# Patient Record
Sex: FEMALE | Race: WHITE | NOT HISPANIC OR LATINO | Employment: PART TIME
[De-identification: names, ages, dates, MRNs, and addresses within clinical notes are randomized per-mention and may not be internally consistent; named-entity substitution may affect disease eponyms.]

---

## 2017-06-24 ENCOUNTER — HEALTH MAINTENANCE LETTER (OUTPATIENT)
Age: 42
End: 2017-06-24

## 2024-02-11 ENCOUNTER — HOSPITAL ENCOUNTER (EMERGENCY)
Facility: HOSPITAL | Age: 49
Discharge: HOME | End: 2024-02-11
Admitting: PHYSICIAN ASSISTANT
Payer: COMMERCIAL

## 2024-02-11 VITALS
HEART RATE: 98 BPM | WEIGHT: 116.7 LBS | BODY MASS INDEX: 21.47 KG/M2 | HEIGHT: 62 IN | RESPIRATION RATE: 18 BRPM | TEMPERATURE: 97.8 F | DIASTOLIC BLOOD PRESSURE: 87 MMHG | SYSTOLIC BLOOD PRESSURE: 132 MMHG | OXYGEN SATURATION: 99 %

## 2024-02-11 DIAGNOSIS — N39.0 UTI (URINARY TRACT INFECTION): ICD-10-CM

## 2024-02-11 LAB
ALBUMIN UR-MCNC: 20 MG/DL
APPEARANCE UR: ABNORMAL
BACTERIA #/AREA URNS HPF: ABNORMAL /HPF
BILIRUB UR QL STRIP: NEGATIVE
COLOR UR AUTO: YELLOW
GLUCOSE UR STRIP-MCNC: NEGATIVE MG/DL
HGB UR QL STRIP: NEGATIVE
HYALINE CASTS: 2 /LPF
KETONES UR STRIP-MCNC: NEGATIVE MG/DL
LEUKOCYTE ESTERASE UR QL STRIP: ABNORMAL
NITRATE UR QL: POSITIVE
PH UR STRIP: 6.5 [PH] (ref 5–7)
RBC URINE: 6 /HPF
SP GR UR STRIP: 1.02 (ref 1–1.03)
SQUAMOUS EPITHELIAL: 3 /HPF
UROBILINOGEN UR STRIP-MCNC: <2 MG/DL
WBC URINE: >182 /HPF

## 2024-02-11 PROCEDURE — 99283 EMERGENCY DEPT VISIT LOW MDM: CPT

## 2024-02-11 PROCEDURE — 87186 SC STD MICRODIL/AGAR DIL: CPT | Performed by: PHYSICIAN ASSISTANT

## 2024-02-11 PROCEDURE — 81001 URINALYSIS AUTO W/SCOPE: CPT | Performed by: PHYSICIAN ASSISTANT

## 2024-02-11 PROCEDURE — 87086 URINE CULTURE/COLONY COUNT: CPT | Performed by: PHYSICIAN ASSISTANT

## 2024-02-11 RX ORDER — NITROFURANTOIN 25; 75 MG/1; MG/1
100 CAPSULE ORAL 2 TIMES DAILY
Qty: 10 CAPSULE | Refills: 0 | Status: SHIPPED | OUTPATIENT
Start: 2024-02-11 | End: 2024-02-16

## 2024-02-11 ASSESSMENT — ACTIVITIES OF DAILY LIVING (ADL): ADLS_ACUITY_SCORE: 33

## 2024-02-12 LAB — BACTERIA UR CULT: ABNORMAL

## 2024-02-12 NOTE — ED PROVIDER NOTES
EMERGENCY DEPARTMENT ENCOUNTER   NAME: Eva Hollingsworth ; AGE: 48 year old female ; YOB: 1975 ; MRN: [id number] ; PCP: Rachell Bermudez     Evaluation Date & Time: 2/11/2024  8:25 PM    ED Provider: María Hinds PA-C    CHIEF COMPLAINT     Urinary Frequency      FINAL ASSESSMENT       ICD-10-CM    1. UTI (urinary tract infection)  N39.0           ED COURSE, MEDICAL DECISION MAKING, PLAN     ED course   8:34 PM Introduced myself to the patient, obtained history of present illness, and performed initial physical exam at this time.   9:23 PM Discussed discharge with the patient, she is agreeable with this plan.      _____________________________________________________________________    Eva is a 48-year-old female presenting with 4 days of urinary frequency, dysuria, and malodorous urine.  States she has had 1 bladder infection in the past many years ago.  She denies any fevers, chills, nausea, vomiting.  She denies any concerns for vaginal infection or STDs.    On exam patient is nontoxic-appearing.  She is in no acute distress.  She has no suprapubic tenderness on palpation.  No abdominal pain.  No CVA tenderness.  Heart and lung sound clear.  She is vitally normal.    Considered UTI, pyelonephritis, interstitial cystitis, vaginitis, STD.    UA was completed here with signs of infection.  Culture pending.    Signs and symptoms appear to be consistent with an uncomplicated urinary tract infection.  Very low concern for an acutely serious or life-threatening condition.  She has no CVA tenderness, fevers, vomiting, tachycardia to suggest pyelonephritis.  She denies any vaginal symptoms or concern for STDs.  Low likelihood of interstitial cystitis given the evidence of infection and on recurrent symptoms.    Will discharge patient with a course of Macrobid.  Recommended pushing fluids.    Indications for reevaluation back in the ER discussed with patient.      *All pertinent lab & imaging studies  independently reviewed. (See chart for details)   *Discussed the results of all the tests and plan with patient and family/guardians.   *All questions were answered.   *The patient and/or family/guardian acknowledged understanding and was agreeable with the care plan.      HISTORY OF PRESENT ILLNESS   Patient information was obtained from: the patient   Use of Intrepreter: N/A     Eva Hollingsworth is a 48 year old female with a pertinent history of pyelonephritis, who presents to the ED for evaluation of urinary frequency.     The patient endorses urinary frequency, dysuria, and malodorous urine.    On 2/7 (four days ago), the patient began experiencing urinary frequency and dysuria. She has been trying AZO and cranberry juice at home. This helped improve symptoms for a short duration, but her symptoms have returned. Last UTI was in 2014. Yesterday, she noticed that her urine was malodorous. No concern for STD today.     The patient denies fever, chills, back pain, abdominal pain, vaginal discharge, vaginal bleeding, nausea, vomiting, and any other symptoms at this time.     MEDICAL HISTORY     Past Medical History:   Diagnosis Date    Bartholin cyst     Bipolar disorder (H)     Cervical dysplasia 1998    Genital herpes     GERD (gastroesophageal reflux disease)     Migraine        Past Surgical History:   Procedure Laterality Date    C/SECTION, LOW TRANSVERSE      CRYOTHERAPY, CERVICAL  1998    TUBAL LIGATION         Family History   Problem Relation Age of Onset    Alcohol/Drug Mother     Heart Disease Father         age 62    Alcohol/Drug Maternal Grandmother     Alcohol/Drug Maternal Grandfather     Cerebrovascular Disease Maternal Grandfather     Heart Disease Paternal Grandfather     Breast Cancer Maternal Aunt         age 56       Social History     Tobacco Use    Smoking status: Every Day     Packs/day: .5     Types: Cigarettes    Tobacco comments:     hoping to get back on chantix   Substance Use Topics     "Alcohol use: No    Drug use: No       nitroFURantoin macrocrystal-monohydrate (MACROBID) 100 MG capsule  cephALEXin (KEFLEX) 500 MG capsule  CLONAZEPAM PO  NORTRIPTYLINE HCL PO  QUEtiapine (SEROQUEL) 25 MG tablet  rizatriptan (MAXALT-MLT) 10 MG disintegrating tablet  topiramate (TOPAMAX) 25 MG capsule  TRAZODONE HCL PO          PHYSICAL EXAM     First Vitals:  Patient Vitals for the past 24 hrs:   BP Temp Temp src Pulse Resp SpO2 Height Weight   02/11/24 2023 132/87 97.8  F (36.6  C) Oral 98 18 99 % 1.575 m (5' 2\") 52.9 kg (116 lb 11.2 oz)         PHYSICAL EXAM:   Constitutional: No acute distress.  Neuro: Awake and alert.  Psych: Calm and cooperative.  Eyes: PERRL. EOMI. Conjunctivae clear.   Mouth: Pink and moist.   Cardio: Regular rate. Adequate perfusion to extremities. Regular rhythm. No murmurs.  Pulmonary: Oxygenating well on RA. No labored breathing. CTA b/l.  Abdomen: Soft and non-distended. No palpable pain.  Back: Appears to move freely. No CVA tenderness.    Skin: Natural color. Warm, dry, intact.       RESULTS     LAB:  All pertinent labs reviewed and interpreted  Labs Ordered and Resulted from Time of ED Arrival to Time of ED Departure   ROUTINE UA WITH MICROSCOPIC REFLEX TO CULTURE - Abnormal       Result Value    Color Urine Yellow      Appearance Urine Turbid (*)     Glucose Urine Negative      Bilirubin Urine Negative      Ketones Urine Negative      Specific Gravity Urine 1.024      Blood Urine Negative      pH Urine 6.5      Protein Albumin Urine 20 (*)     Urobilinogen Urine <2.0      Nitrite Urine Positive (*)     Leukocyte Esterase Urine 500 Joaquin/uL (*)     Bacteria Urine Many (*)     RBC Urine 6 (*)     WBC Urine >182 (*)     Squamous Epithelials Urine 3 (*)     Hyaline Casts Urine 2     URINE CULTURE       RADIOLOGY:  No orders to display       ECG:    N/A        PROCEDURES     None             History:  Supplemental history from: Documented in chart  External Record(s) reviewed: Documented " in chart    Work Up:  Chart documentation includes differentials considered and any EKGs or imaging independently interpreted by provider, where specified.  In additional to work up documented, I considered the following work up: Documented in chart, if applicable.    External consultation:  Discussion of management with another provider: Documented in chart, if applicable    Complicating factors:  Care impacted by chronic illness: N/A and Smoking / Nicotine Use  Care affected by social determinants of health: N/A    Disposition considerations: Discharge. I prescribed additional prescription strength medication(s) as charted. See documentation for any additional details.    FINAL IMPRESSION:    ICD-10-CM    1. UTI (urinary tract infection)  N39.0             MEDICATIONS GIVEN IN THE EMERGENCY DEPARTMENT:  Medications - No data to display      NEW PRESCRIPTIONS STARTED AT TODAY'S ED VISIT:  Discharge Medication List as of 2/11/2024  9:48 PM        START taking these medications    Details   nitroFURantoin macrocrystal-monohydrate (MACROBID) 100 MG capsule Take 1 capsule (100 mg) by mouth 2 times daily for 5 days, Disp-10 capsule, R-0, Local Print                  I, Steph Yeager, am serving as a scribe to document services personally performed by María Hinds PA-C, based on my observation and the provider's statements to me. I, María Hinds PA-C attest that Steph Yeager is acting in a scribe capacity, has observed my performance of the services and has documented them in accordance with my direction.     Some or all of this documentation has been completed using dictation software and mild grammatical errors may be present. Please contact me with any concerns regarding this.       María Hinds PA-C  Emergency Medicine   [facility] EMERGENCY DEPARTMENT       María Hinds PA-C  02/12/24 [id number]

## 2024-02-12 NOTE — ED TRIAGE NOTES
Hurts to urinate since Wed. Has been taking AZO without relief     Triage Assessment (Adult)       Row Name 02/11/24 2024          Triage Assessment    Airway WDL WDL

## 2024-02-13 ENCOUNTER — TELEPHONE (OUTPATIENT)
Dept: EMERGENCY MEDICINE | Facility: HOSPITAL | Age: 49
End: 2024-02-13
Payer: COMMERCIAL

## 2024-02-13 NOTE — LETTER
February 13, 2024        Eva Hollingsworth  [address]   [place] [address]          Dear Eva Hollingsworth:    You were seen in the [facility] Emergency Department at [facility] EMERGENCY DEPARTMENT on 2/11/2024.  We are unable to reach you by phone, so we are sending you this letter.     It is important that you call [facility] Emergency Department lab result nurse at [phone], as we have information to relay to you AND/OR we MAY have to make some changes in your treatment.    Best time to call back is between 9AM and 5:30PM, 7 days a week.      Sincerely,     [facility] Emergency Department Lab Result RN  [phone]

## 2024-02-13 NOTE — TELEPHONE ENCOUNTER
"[facility]    Reason for call: Lab Result Notification     Lab Result (including Rx patient on, if applicable).  If culture, copy of lab report at bottom.  Lab Result: [facility] Emergency Dept discharge antibiotic (if prescribed): Nitrofurantoin Macrocrystal-Monohydrate (Macrobid) 100 mg PO capsule, 1 capsule (100 mg) by mouth 2 times daily for 5 days   Date of Rx (if applicable):  02/11/2024  Recommendations in treatment per [facility] ED Lab result Urine culture protocol.    Creatinine Level (mg/dl) No results found for: \"CR\" Creatinine clearance (ml/min), if applicable    Creatinine clearance cannot be calculated (No successful lab value found.)     Patient's current Symptoms:   Unable to Leave message,  full  Letter Sent    If pt returns call, verify symptom improvement and encourage finishing ABX course as it is shown to be effective     Damian Shukla RN       "

## 2024-02-17 NOTE — TELEPHONE ENCOUNTER
"[facility]    Reason for call: Lab Result Notification     Lab Result (including Rx patient on, if applicable).  If culture, copy of lab report at bottom.  Lab Result:  Final Urine Culture Report on 2/12/24  [facility] Emergency Dept discharge antibiotic prescribed: Nitrofurantoin Macrocrystal-Monohydrate (Macrobid) 100 mg PO capsule, 1 capsule (100 mg) by mouth 2 times daily for 5 days   #1. Bacteria, >100,000 CFU/ML Escherichia coli is SUSCEPTIBLE to Antibiotic.    No change in treatment per [facility] ED lab result Urine Culture protocol.    Creatinine Level (mg/dl) No results found for: \"CR\" Creatinine clearance (ml/min), if applicable    Creatinine clearance cannot be calculated (No successful lab value found.)     Patient's current Symptoms:   Return call from patient she is overall improved \"it worked within the first 2 days, I'm really happy\"  Fever:  None  Genitourinary symptoms:  No pain/burning, no frequency/urgency    RN Recommendations/Instructions per [facility] ED lab result protocol:   [facility] ED lab result protocol utilized: Urine culture  Patient notified of lab results and recommendation to continue & complete antibiotic probiotic appropriate, UTI prevention, return for any worsening fevers, flank pain, nausea/vomiting. Patient verbalized understanding and agrees with plan.    Patient/care giver notified to contact your PCP clinic or return to the Emergency department if your:  Symptoms return.  Symptoms do not improve after 3 days on antibiotic.  Symptoms do not resolve after completing antibiotic.  Symptoms worsen or other concerning symptoms.    Kurt Montes RN    "

## 2024-02-17 NOTE — TELEPHONE ENCOUNTER
"[facility]    Reason for call: Lab Result Notification     Lab Result (including Rx patient on, if applicable).  If culture, copy of lab report at bottom.  Lab Result: Final Urine Culture Report on 2/12/24  [facility] Emergency Dept discharge antibiotic prescribed: Nitrofurantoin Macrocrystal-Monohydrate (Macrobid) 100 mg PO capsule, 1 capsule (100 mg) by mouth 2 times daily for 5 days   #1. Bacteria, >100,000 CFU/ML Escherichia coli is SUSCEPTIBLE to Antibiotic.    No change in treatment per [facility] ED lab result Urine Culture protocol.    Creatinine Level (mg/dl) No results found for: \"CR\" Creatinine clearance (ml/min), if applicable    Creatinine clearance cannot be calculated (No successful lab value found.)     Patient's current Symptoms:   Patient left  on ED results team regarding letter she received in the mail, return call to patient, no answer, unable to leave voice message d/t mailbox full      Kurt Montes RN    "

## 2024-09-21 ENCOUNTER — ANCILLARY PROCEDURE (OUTPATIENT)
Dept: GENERAL RADIOLOGY | Facility: CLINIC | Age: 49
End: 2024-09-21
Attending: PHYSICIAN ASSISTANT
Payer: COMMERCIAL

## 2024-09-21 ENCOUNTER — OFFICE VISIT (OUTPATIENT)
Dept: FAMILY MEDICINE | Facility: CLINIC | Age: 49
End: 2024-09-21
Payer: COMMERCIAL

## 2024-09-21 VITALS
OXYGEN SATURATION: 96 % | SYSTOLIC BLOOD PRESSURE: 110 MMHG | TEMPERATURE: 96.8 F | DIASTOLIC BLOOD PRESSURE: 64 MMHG | HEART RATE: 85 BPM

## 2024-09-21 DIAGNOSIS — R10.9 ACUTE LEFT FLANK PAIN: ICD-10-CM

## 2024-09-21 DIAGNOSIS — B96.89 BACTERIAL VAGINOSIS: ICD-10-CM

## 2024-09-21 DIAGNOSIS — N39.0 ACUTE UTI (URINARY TRACT INFECTION): Primary | ICD-10-CM

## 2024-09-21 DIAGNOSIS — N76.0 BACTERIAL VAGINOSIS: ICD-10-CM

## 2024-09-21 DIAGNOSIS — N89.8 VAGINAL DISCHARGE: ICD-10-CM

## 2024-09-21 LAB
ALBUMIN UR-MCNC: ABNORMAL MG/DL
APPEARANCE UR: ABNORMAL
BILIRUB UR QL STRIP: NEGATIVE
COLOR UR AUTO: ABNORMAL
GLUCOSE UR STRIP-MCNC: NEGATIVE MG/DL
HGB UR QL STRIP: NEGATIVE
KETONES UR STRIP-MCNC: NEGATIVE MG/DL
LEUKOCYTE ESTERASE UR QL STRIP: ABNORMAL
NITRATE UR QL: NEGATIVE
PH UR STRIP: 7 [PH] (ref 5–7)
SP GR UR STRIP: 1.02 (ref 1–1.03)
UROBILINOGEN UR STRIP-ACNC: 1 E.U./DL

## 2024-09-21 PROCEDURE — 36415 COLL VENOUS BLD VENIPUNCTURE: CPT

## 2024-09-21 PROCEDURE — 85025 COMPLETE CBC W/AUTO DIFF WBC: CPT

## 2024-09-21 PROCEDURE — 87086 URINE CULTURE/COLONY COUNT: CPT

## 2024-09-21 PROCEDURE — 99204 OFFICE O/P NEW MOD 45 MIN: CPT

## 2024-09-21 PROCEDURE — 80048 BASIC METABOLIC PNL TOTAL CA: CPT

## 2024-09-21 PROCEDURE — 87491 CHLMYD TRACH DNA AMP PROBE: CPT

## 2024-09-21 PROCEDURE — 87591 N.GONORRHOEAE DNA AMP PROB: CPT

## 2024-09-21 PROCEDURE — 74018 RADEX ABDOMEN 1 VIEW: CPT | Mod: TC | Performed by: RADIOLOGY

## 2024-09-21 PROCEDURE — 81001 URINALYSIS AUTO W/SCOPE: CPT

## 2024-09-21 ASSESSMENT — ENCOUNTER SYMPTOMS: DYSURIA: 1

## 2024-09-21 NOTE — LETTER
September 24, 2024      Eva Hollingsworth  [address]   [place] [address]        To Whom It May Concern:    Eva Hollingsworth  was seen on 09/24/24  .  Please excuse her from work  until 09/26/2024 due to illness.        Sincerely,        [facility]      Beti Villanueva PA-C

## 2024-09-21 NOTE — PROGRESS NOTES
Assessment & Plan        1. Acute UTI (urinary tract infection)    -UA showing signs of bladder infection.  Patient to be treated with Macrobid pending urine culture results.  - nitroFURantoin macrocrystal-monohydrate (MACROBID) 100 MG capsule; Take 1 capsule (100 mg) by mouth 2 times daily for 5 days.  Dispense: 10 capsule; Refill: 0    2. Acute left flank pain    -CBC reassuring, no leukocytosis.  BMP is normal with normal kidney function.  No concerns for acute pyelonephritis.  Patient has no fever, chills, nausea, vomiting.  The x-ray KUB does not show signs of renal stones.  - UA without Microscopic  - CBC with platelets differential  - Basic metabolic panel  - Urine Culture Aerobic Bacterial  - XR KUB  - UMIC - Urine Micro Only    3. Vaginal discharge    -Chlamydia and gonorrhea pending.  -Will call patient with results  - Chlamydia trachomatis/Neisseria gonorrhoeae by PCR - Clinic Collect    Results for orders placed or performed in visit on 09/21/24   XR KUB     Status: None    Narrative    EXAM: XR KUB  LOCATION: [facility]  DATE: 9/21/2024    INDICATION: left flank pain. r o renal stones  COMPARISON: None.      Impression    IMPRESSION: Negative abdomen. Bowel gas pattern is normal. Nothing for obstruction or free air. No evidence for renal stones.   UA without Microscopic     Status: Abnormal   Result Value Ref Range    Color Urine Zeinab (A) Colorless, Straw, Light Yellow, Yellow    Appearance Urine Cloudy (A) Clear    Glucose Urine Negative Negative mg/dL    Bilirubin Urine Negative Negative    Ketones Urine Negative Negative mg/dL    Specific Gravity Urine 1.020 1.003 - 1.035    Blood Urine Negative Negative    pH Urine 7.0 5.0 - 7.0    Protein Albumin Urine Trace (A) Negative mg/dL    Urobilinogen Urine 1.0 0.2, 1.0 E.U./dL    Nitrite Urine Negative Negative    Leukocyte Esterase Urine Small (A) Negative   Basic metabolic panel     Status: Abnormal   Result Value  Ref Range    Sodium 143 135 - 145 mmol/L    Potassium 4.3 3.4 - 5.3 mmol/L    Chloride 104 98 - 107 mmol/L    Carbon Dioxide (CO2) 30 (H) 22 - 29 mmol/L    Anion Gap 9 7 - 15 mmol/L    Urea Nitrogen 15.7 6.0 - 20.0 mg/dL    Creatinine 0.86 0.51 - 0.95 mg/dL    GFR Estimate 83 >60 mL/min/1.73m2    Calcium 9.8 8.8 - 10.4 mg/dL    Glucose 90 70 - 99 mg/dL   UMIC - Urine Micro Only     Status: Abnormal   Result Value Ref Range    Bacteria Urine Moderate (A) None Seen /HPF    RBC Urine 2-5 (A) 0-2 /HPF /HPF    WBC Urine 5-10 (A) 0-5 /HPF /HPF   CBC with platelets and differential     Status: None   Result Value Ref Range    WBC Count 8.8 4.0 - 11.0 10e3/uL    RBC Count 3.97 3.80 - 5.20 10e6/uL    Hemoglobin 12.6 11.7 - 15.7 g/dL    Hematocrit 37.8 35.0 - 47.0 %    MCV 95 78 - 100 fL    MCH 31.7 26.5 - 33.0 pg    MCHC 33.3 31.5 - 36.5 g/dL    RDW 12.1 10.0 - 15.0 %    Platelet Count 294 150 - 450 10e3/uL    % Neutrophils 60 %    % Lymphocytes 29 %    % Monocytes 8 %    % Eosinophils 2 %    % Basophils 1 %    % Immature Granulocytes 0 %    NRBCs per 100 WBC 0 <1 /100    Absolute Neutrophils 5.3 1.6 - 8.3 10e3/uL    Absolute Lymphocytes 2.6 0.8 - 5.3 10e3/uL    Absolute Monocytes 0.7 0.0 - 1.3 10e3/uL    Absolute Eosinophils 0.2 0.0 - 0.7 10e3/uL    Absolute Basophils 0.0 0.0 - 0.2 10e3/uL    Absolute Immature Granulocytes 0.0 <=0.4 10e3/uL    Absolute NRBCs 0.0 10e3/uL   CBC with platelets differential     Status: None    Narrative    The following orders were created for panel order CBC with platelets differential.  Procedure                               Abnormality         Status                     ---------                               -----------         ------                     CBC with platelets and d...[[id number]]                      Final result                 Please view results for these tests on the individual orders.       Patient Instructions   UA is suggestive of bladder infection. You  will be treated  with Nitrofurantoin monohydrate/macrocrystals (Macrobid) 100 mg PO BID x 5 days until cultures return and will adjust as necessary.  Take antibiotic as directed.  Increase fluids.   Chlamydia and gonorrhea labs are still pending.  Will call you with results      Return if symptoms worsen or fail to improve, for Follow up.    At the end of the encounter, I discussed results, diagnosis, medications. Discussed red flags for immediate return to clinic/ER, as well as indications for follow up if no improvement. Patient understood and agreed to plan. Patient was stable for discharge.    Lady Velasquez is a 49 year old female who presents to clinic today  for the following health issues:  Chief Complaint   Patient presents with    Urgent Care    Vaginal Problem     4 days with vaginal discharge, possible bleeding because she has not have a menstrual for 2 yrs. Now having left flank pain     HPI    Patient reports vaginal discharge, dysuria for 4 days, left flank pain for one day. She notes yellowish discharge, with no foul smelling. She reports having unprotected sex with a new partner one week ago. Does not recall if partner has an STI. No fever or chills. No history of kidney stones.     Review of Systems   Genitourinary:  Positive for dysuria and vaginal discharge.       Problem List:  2014-09: Bipolar 1 disorder (H)  2014-09: Pyelonephritis, acute  2014-09: Hypokalemia  2012-09: Migraines  2010-10: CARDIOVASCULAR SCREENING; LDL GOAL LESS THAN 160  2010-04: Tobacco abuse  2010-02: Migraine headache  2010-02: Genital herpes      Past Medical History:   Diagnosis Date    Bartholin cyst     I & D x 2    Bipolar disorder (H)     Cervical dysplasia 1998    Genital herpes     GERD (gastroesophageal reflux disease)     Migraine        Social History     Tobacco Use    Smoking status: Every Day     Current packs/day: 0.50     Types: Cigarettes, Vaping Device    Smokeless tobacco: Not on file    Tobacco comments:      hoping to get back on chantix   Substance Use Topics    Alcohol use: No           Objective    /64   Pulse 85   Temp 96.8  F (36  C) (Tympanic)   LMP 03/23/2012   SpO2 96%   Physical Exam  Vitals and nursing note reviewed.   Cardiovascular:      Rate and Rhythm: Normal rate and regular rhythm.   Pulmonary:      Effort: Pulmonary effort is normal.      Breath sounds: Normal breath sounds.   Abdominal:      General: Abdomen is flat.      Palpations: Abdomen is soft.      Tenderness: There is no abdominal tenderness. There is left CVA tenderness. There is no right CVA tenderness.   Lymphadenopathy:      Cervical: No cervical adenopathy.   Skin:     General: Skin is warm and dry.      Findings: No rash.   Neurological:      General: No focal deficit present.      Mental Status: She is alert and oriented to person, place, and time.   Psychiatric:         Mood and Affect: Mood normal.         Behavior: Behavior normal.              Beti Villanueva PA-C

## 2024-09-22 ENCOUNTER — TELEPHONE (OUTPATIENT)
Dept: FAMILY MEDICINE | Facility: CLINIC | Age: 49
End: 2024-09-22
Payer: COMMERCIAL

## 2024-09-22 DIAGNOSIS — N89.8 VAGINAL DISCHARGE: Primary | ICD-10-CM

## 2024-09-22 LAB
ANION GAP SERPL CALCULATED.3IONS-SCNC: 9 MMOL/L (ref 7–15)
BACTERIA #/AREA URNS HPF: ABNORMAL /HPF
BASOPHILS # BLD AUTO: 0 10E3/UL (ref 0–0.2)
BASOPHILS NFR BLD AUTO: 1 %
BUN SERPL-MCNC: 15.7 MG/DL (ref 6–20)
CALCIUM SERPL-MCNC: 9.8 MG/DL (ref 8.8–10.4)
CHLORIDE SERPL-SCNC: 104 MMOL/L (ref 98–107)
CREAT SERPL-MCNC: 0.86 MG/DL (ref 0.51–0.95)
EGFRCR SERPLBLD CKD-EPI 2021: 83 ML/MIN/1.73M2
EOSINOPHIL # BLD AUTO: 0.2 10E3/UL (ref 0–0.7)
EOSINOPHIL NFR BLD AUTO: 2 %
ERYTHROCYTE [DISTWIDTH] IN BLOOD BY AUTOMATED COUNT: 12.1 % (ref 10–15)
GLUCOSE SERPL-MCNC: 90 MG/DL (ref 70–99)
HCO3 SERPL-SCNC: 30 MMOL/L (ref 22–29)
HCT VFR BLD AUTO: 37.8 % (ref 35–47)
HGB BLD-MCNC: 12.6 G/DL (ref 11.7–15.7)
IMM GRANULOCYTES # BLD: 0 10E3/UL
IMM GRANULOCYTES NFR BLD: 0 %
LYMPHOCYTES # BLD AUTO: 2.6 10E3/UL (ref 0.8–5.3)
LYMPHOCYTES NFR BLD AUTO: 29 %
MCH RBC QN AUTO: 31.7 PG (ref 26.5–33)
MCHC RBC AUTO-ENTMCNC: 33.3 G/DL (ref 31.5–36.5)
MCV RBC AUTO: 95 FL (ref 78–100)
MONOCYTES # BLD AUTO: 0.7 10E3/UL (ref 0–1.3)
MONOCYTES NFR BLD AUTO: 8 %
NEUTROPHILS # BLD AUTO: 5.3 10E3/UL (ref 1.6–8.3)
NEUTROPHILS NFR BLD AUTO: 60 %
NRBC # BLD AUTO: 0 10E3/UL
NRBC BLD AUTO-RTO: 0 /100
PLATELET # BLD AUTO: 294 10E3/UL (ref 150–450)
POTASSIUM SERPL-SCNC: 4.3 MMOL/L (ref 3.4–5.3)
RBC # BLD AUTO: 3.97 10E6/UL (ref 3.8–5.2)
RBC #/AREA URNS AUTO: ABNORMAL /HPF
SODIUM SERPL-SCNC: 143 MMOL/L (ref 135–145)
WBC # BLD AUTO: 8.8 10E3/UL (ref 4–11)
WBC #/AREA URNS AUTO: ABNORMAL /HPF

## 2024-09-22 RX ORDER — NITROFURANTOIN 25; 75 MG/1; MG/1
100 CAPSULE ORAL 2 TIMES DAILY
Qty: 10 CAPSULE | Refills: 0 | Status: SHIPPED | OUTPATIENT
Start: 2024-09-22 | End: 2024-09-27

## 2024-09-22 NOTE — TELEPHONE ENCOUNTER
Test Results    Contacts       Contact Date/Time Type Contact Phone/Fax    09/22/2024 05:23 PM CDT Phone (Incoming) Eva Hollingsworth (Self) [phone] (M)            Who ordered the test:  urgent provider moa    Type of test: Lab    Date of test:  9/21/24    Where was the test performed:  [facility]    What are your questions/concerns?:  Requesting results. Declined mychart assistance.    Okay to leave a detailed message?: Yes at Cell number on file:    Telephone Information:   Mobile [phone]

## 2024-09-22 NOTE — PATIENT INSTRUCTIONS
UA is suggestive of bladder infection. You  will be treated with Nitrofurantoin monohydrate/macrocrystals (Macrobid) 100 mg PO BID x 5 days until cultures return and will adjust as necessary.  Take antibiotic as directed.  Increase fluids.   Chlamydia and gonorrhea labs are still pending.  Will call you with results

## 2024-09-23 ENCOUNTER — TELEPHONE (OUTPATIENT)
Dept: FAMILY MEDICINE | Facility: CLINIC | Age: 49
End: 2024-09-23
Payer: COMMERCIAL

## 2024-09-23 LAB
BACTERIA UR CULT: NO GROWTH
C TRACH DNA SPEC QL PROBE+SIG AMP: NEGATIVE
N GONORRHOEA DNA SPEC QL NAA+PROBE: NEGATIVE

## 2024-09-23 NOTE — TELEPHONE ENCOUNTER
Test Results        Who ordered the test:  [facility]    Type of test: Lab    Date of test:  09/21/24    Where was the test performed:  [facility]    What are your questions/concerns?:  Patient looking for results. Hasn't heard back yet.    Okay to leave a detailed message?: Yes at Home number on file [phone] (home)

## 2024-09-24 ENCOUNTER — LAB (OUTPATIENT)
Dept: LAB | Facility: CLINIC | Age: 49
End: 2024-09-24
Payer: COMMERCIAL

## 2024-09-24 DIAGNOSIS — N89.8 VAGINAL DISCHARGE: ICD-10-CM

## 2024-09-24 LAB
CLUE CELLS: PRESENT
TRICHOMONAS, WET PREP: ABNORMAL
WBC'S/HIGH POWER FIELD, WET PREP: ABNORMAL
YEAST, WET PREP: ABNORMAL

## 2024-09-24 PROCEDURE — 87210 SMEAR WET MOUNT SALINE/INK: CPT

## 2024-09-24 RX ORDER — METRONIDAZOLE 500 MG/1
500 TABLET ORAL 2 TIMES DAILY
Qty: 14 TABLET | Refills: 0 | Status: SHIPPED | OUTPATIENT
Start: 2024-09-24 | End: 2024-10-01

## 2024-09-24 NOTE — TELEPHONE ENCOUNTER
Called and spoke to pt. Relay results to pt and inform her about her medication. Per pt never went to  the abx. But she will go  the abx and start on it as her symptoms is still bothering her. Pt has no further questions or concerns.     PETROS Thrasher, [facility] September 24, 2024 10:37 AM

## 2024-09-24 NOTE — PROGRESS NOTES
Called patient  Patient has not started the antibiotic for UTI  She is still having vaginal discharge  Urine culture is negative for growth  GC and Chlamydia are negative  I ordered wet prep  Patient will go to the [facility] lab and leave a sample  We will contact patient with results and sent medication to treat if infection is present.    1. Acute UTI (urinary tract infection)    - nitroFURantoin macrocrystal-monohydrate (MACROBID) 100 MG capsule; Take 1 capsule (100 mg) by mouth 2 times daily for 5 days.  Dispense: 10 capsule; Refill: 0    2. Acute left flank pain    - UA without Microscopic  - CBC with platelets differential  - Basic metabolic panel  - Urine Culture Aerobic Bacterial  - XR KUB  - UMIC - Urine Micro Only    3. Vaginal discharge    - Chlamydia trachomatis/Neisseria gonorrhoeae by PCR - Clinic Collect  - Wet prep; Future      4. Bacterial vaginosis    - metroNIDAZOLE (FLAGYL) 500 MG tablet; Take 1 tablet (500 mg) by mouth 2 times daily for 7 days.  Dispense: 14 tablet; Refill: 0    Patient had a wet prep done today.  Wet prep came back showing bacterial vaginosis infection.  I sent a prescription for Flagyl to patient's pharmacy to treat this.  Patient was notified of results.  Patient verbalized understanding.

## 2025-06-24 ENCOUNTER — APPOINTMENT (OUTPATIENT)
Dept: CT IMAGING | Facility: HOSPITAL | Age: 50
End: 2025-06-24
Attending: PHYSICIAN ASSISTANT
Payer: COMMERCIAL

## 2025-06-24 ENCOUNTER — HOSPITAL ENCOUNTER (EMERGENCY)
Facility: HOSPITAL | Age: 50
Discharge: HOME | End: 2025-06-24
Payer: COMMERCIAL

## 2025-06-24 VITALS
WEIGHT: 116.8 LBS | HEIGHT: 62 IN | DIASTOLIC BLOOD PRESSURE: 70 MMHG | TEMPERATURE: 98.2 F | SYSTOLIC BLOOD PRESSURE: 107 MMHG | BODY MASS INDEX: 21.49 KG/M2 | HEART RATE: 72 BPM | RESPIRATION RATE: 20 BRPM | OXYGEN SATURATION: 97 %

## 2025-06-24 DIAGNOSIS — R09.1 PLEURISY: ICD-10-CM

## 2025-06-24 DIAGNOSIS — R07.89 LEFT-SIDED CHEST WALL PAIN: ICD-10-CM

## 2025-06-24 DIAGNOSIS — K42.9 PERIUMBILICAL HERNIA: ICD-10-CM

## 2025-06-24 LAB
ALBUMIN SERPL BCG-MCNC: 4.2 G/DL (ref 3.5–5.2)
ALP SERPL-CCNC: 87 U/L (ref 40–150)
ALT SERPL W P-5'-P-CCNC: 18 U/L (ref 0–50)
ANION GAP SERPL CALCULATED.3IONS-SCNC: 7 MMOL/L (ref 7–15)
AST SERPL W P-5'-P-CCNC: 22 U/L (ref 0–45)
BASOPHILS # BLD AUTO: 0 10E3/UL (ref 0–0.2)
BASOPHILS NFR BLD AUTO: 0 %
BILIRUB DIRECT SERPL-MCNC: 0.09 MG/DL (ref 0–0.3)
BILIRUB SERPL-MCNC: 0.2 MG/DL
BUN SERPL-MCNC: 8 MG/DL (ref 6–20)
CALCIUM SERPL-MCNC: 9.8 MG/DL (ref 8.8–10.4)
CHLORIDE SERPL-SCNC: 102 MMOL/L (ref 98–107)
CREAT SERPL-MCNC: 0.63 MG/DL (ref 0.51–0.95)
CRP SERPL-MCNC: <3 MG/L
EGFRCR SERPLBLD CKD-EPI 2021: >90 ML/MIN/1.73M2
EOSINOPHIL # BLD AUTO: 0.2 10E3/UL (ref 0–0.7)
EOSINOPHIL NFR BLD AUTO: 2 %
ERYTHROCYTE [DISTWIDTH] IN BLOOD BY AUTOMATED COUNT: 12.2 % (ref 10–15)
GLUCOSE SERPL-MCNC: 107 MG/DL (ref 70–99)
HCO3 SERPL-SCNC: 31 MMOL/L (ref 22–29)
HCT VFR BLD AUTO: 38.9 % (ref 35–47)
HGB BLD-MCNC: 12.8 G/DL (ref 11.7–15.7)
HOLD SPECIMEN: NORMAL
HOLD SPECIMEN: NORMAL
IMM GRANULOCYTES # BLD: 0 10E3/UL
IMM GRANULOCYTES NFR BLD: 0 %
LIPASE SERPL-CCNC: 47 U/L (ref 13–60)
LYMPHOCYTES # BLD AUTO: 2.5 10E3/UL (ref 0.8–5.3)
LYMPHOCYTES NFR BLD AUTO: 27 %
MCH RBC QN AUTO: 30.3 PG (ref 26.5–33)
MCHC RBC AUTO-ENTMCNC: 32.9 G/DL (ref 31.5–36.5)
MCV RBC AUTO: 92 FL (ref 78–100)
MONOCYTES # BLD AUTO: 0.7 10E3/UL (ref 0–1.3)
MONOCYTES NFR BLD AUTO: 7 %
NEUTROPHILS # BLD AUTO: 5.9 10E3/UL (ref 1.6–8.3)
NEUTROPHILS NFR BLD AUTO: 64 %
NRBC # BLD AUTO: 0 10E3/UL
NRBC BLD AUTO-RTO: 0 /100
PLATELET # BLD AUTO: 293 10E3/UL (ref 150–450)
POTASSIUM SERPL-SCNC: 4.2 MMOL/L (ref 3.4–5.3)
PROT SERPL-MCNC: 7.2 G/DL (ref 6.4–8.3)
RBC # BLD AUTO: 4.22 10E6/UL (ref 3.8–5.2)
SODIUM SERPL-SCNC: 140 MMOL/L (ref 135–145)
WBC # BLD AUTO: 9.4 10E3/UL (ref 4–11)

## 2025-06-24 PROCEDURE — 83690 ASSAY OF LIPASE: CPT | Performed by: STUDENT IN AN ORGANIZED HEALTH CARE EDUCATION/TRAINING PROGRAM

## 2025-06-24 PROCEDURE — 74177 CT ABD & PELVIS W/CONTRAST: CPT

## 2025-06-24 PROCEDURE — 86140 C-REACTIVE PROTEIN: CPT | Performed by: PHYSICIAN ASSISTANT

## 2025-06-24 PROCEDURE — 250N000011 HC RX IP 250 OP 636: Performed by: PHYSICIAN ASSISTANT

## 2025-06-24 PROCEDURE — 85004 AUTOMATED DIFF WBC COUNT: CPT | Performed by: STUDENT IN AN ORGANIZED HEALTH CARE EDUCATION/TRAINING PROGRAM

## 2025-06-24 PROCEDURE — 82248 BILIRUBIN DIRECT: CPT | Performed by: STUDENT IN AN ORGANIZED HEALTH CARE EDUCATION/TRAINING PROGRAM

## 2025-06-24 PROCEDURE — 36415 COLL VENOUS BLD VENIPUNCTURE: CPT | Performed by: STUDENT IN AN ORGANIZED HEALTH CARE EDUCATION/TRAINING PROGRAM

## 2025-06-24 PROCEDURE — 99285 EMERGENCY DEPT VISIT HI MDM: CPT | Mod: 25

## 2025-06-24 PROCEDURE — 96374 THER/PROPH/DIAG INJ IV PUSH: CPT | Mod: 59

## 2025-06-24 PROCEDURE — 84155 ASSAY OF PROTEIN SERUM: CPT | Performed by: STUDENT IN AN ORGANIZED HEALTH CARE EDUCATION/TRAINING PROGRAM

## 2025-06-24 RX ORDER — IOPAMIDOL 755 MG/ML
57 INJECTION, SOLUTION INTRAVASCULAR ONCE
Status: COMPLETED | OUTPATIENT
Start: 2025-06-24 | End: 2025-06-24

## 2025-06-24 RX ORDER — KETOROLAC TROMETHAMINE 15 MG/ML
15 INJECTION, SOLUTION INTRAMUSCULAR; INTRAVENOUS ONCE
Status: COMPLETED | OUTPATIENT
Start: 2025-06-24 | End: 2025-06-24

## 2025-06-24 RX ADMIN — KETOROLAC TROMETHAMINE 15 MG: 15 INJECTION, SOLUTION INTRAMUSCULAR; INTRAVENOUS at 13:39

## 2025-06-24 RX ADMIN — IOPAMIDOL 57 ML: 755 INJECTION, SOLUTION INTRAVENOUS at 14:12

## 2025-06-24 ASSESSMENT — COLUMBIA-SUICIDE SEVERITY RATING SCALE - C-SSRS
2. HAVE YOU ACTUALLY HAD ANY THOUGHTS OF KILLING YOURSELF IN THE PAST MONTH?: NO
1. IN THE PAST MONTH, HAVE YOU WISHED YOU WERE DEAD OR WISHED YOU COULD GO TO SLEEP AND NOT WAKE UP?: NO
6. HAVE YOU EVER DONE ANYTHING, STARTED TO DO ANYTHING, OR PREPARED TO DO ANYTHING TO END YOUR LIFE?: NO

## 2025-06-24 ASSESSMENT — ACTIVITIES OF DAILY LIVING (ADL)
ADLS_ACUITY_SCORE: 41
ADLS_ACUITY_SCORE: 41

## 2025-06-24 NOTE — ED PROVIDER NOTES
"EMERGENCY DEPARTMENT ENCOUNTER      NAME: Eva Hollingsworth  AGE: 50 year old female  YOB: 1975  MRN: [id number]  EVALUATION DATE & TIME: No admission date for patient encounter.    PCP: No Ref-Primary, Physician    ED PROVIDER: Rajeev Caldwell PA-C      Chief Complaint   Patient presents with    Abdominal Pain    Back Pain         FINAL IMPRESSION:  1. Left-sided chest wall pain    2. Pleurisy    3. Periumbilical hernia          ED COURSE & MEDICAL DECISION MAKING:    Pertinent Labs & Imaging studies reviewed. (See chart for details)  12:58 PM I met the patient and performed my initial interview and exam.  3:06 PM Patient updated, discussed plan for discharge.     50 year old female presents to the Emergency Department for evaluation of abdominal pain.     ED Course as of 06/24/25 1507   Tue Jun 24, 2025   1307 Patient is a 50-year-old female, past medical history of bipolar, migraines, presents emergency department for evaluation of left anterior rib pain, as well as epigastric abdominal pain.  Patient reports that the pain started this morning.  Notes increase in pain with deep inspiration.  Feels like there is \"a lump\" at the anterior portion of her abdomen.  On arrival here, she is not febrile tachycardic tachypneic.  She is PERC negative.  She does have tenderness to palpation of the left anterior rib, as well as the epigastric abdominal area with no significant rebound or guarding.  Distal of her gallbladder, does not drink significant amount of alcohol.  Denies any CVA tenderness.  No fevers.  No urinary symptoms.  Still having normal bowel movements and stool.  Denies nausea.  Exam here otherwise unremarkable.  Doubt significant urologic etiology, seems more consistent with possible cholecystitis, choledocholithiasis, pancreatitis, possible musculoskeletal etiology.  Will obtain CT chest abdomen and pelvis given distribution of the pain to ensure that there is no evidence of pneumothorax, " significant herniation of bowel, or other abdominal wall fluid collection.  Patient given dose of Toradol here in the emergency department for symptom management.   1420     I have independently reviewed the CT chest abdomen and pelvis, I do not appreciate significant effusion or pneumothorax.  Pending final radiology read.   1455 CT Chest/Abdomen/Pelvis w Contrast  IMPRESSION:     1.  No acute abnormality or specific cause of the patient's symptoms are identified.     2.  Mild upper lobe predominant emphysema with prominent bullae at the apices.     1506     Patient updated on imaging results here in the emergency department, no evidence of significant abnormality within the chest wall, abdomen or pelvis.  Laboratory studies here are stable.  Suspect there may be some component of pleurisy, pain is certainly reproducible with palpation of the left anterior chest wall.  Certainly no evidence of gallbladder or bowel etiology.  Otherwise workup here is unremarkable.  Patient does feel little bit better after some Toradol here.  Plan for discharge and follow-up as an outpatient, discussed return precautions and she expresses understanding.       Medical Decision Making  I reviewed the EMR: Outpatient Record: Outpatient telephone: 03/04/2025, outpatient emergency department visit on 02/26/2025  Discharge. No recommendations on prescription strength medication(s). N/A.    MIPS (CTPE, Dental pain, Landeros, Sinusitis, Asthma/COPD, Head Trauma): CT Pulmonary Angiogram:CT PA was ordered for reason(s) other than PE.    SEPSIS: None    At the conclusion of the encounter I discussed the results of all of the tests and the disposition. The questions were answered. The patient or family acknowledged understanding and was agreeable with the care plan.     0 minutes of critical care time     MEDICATIONS GIVEN IN THE EMERGENCY:  Medications   ketorolac (TORADOL) injection 15 mg (15 mg Intravenous $Given 6/24/25 2557)   iopamidol  (ISOVUE-370) solution 57 mL (57 mLs Intravenous $Given 6/24/25 2324)       NEW PRESCRIPTIONS STARTED AT TODAY'S ER VISIT  New Prescriptions    No medications on file       =================================================================    HPI    Patient information was obtained from: Patient     Use of : N/A        Eva Hollingsworth is a 50 year old female with a pertinent history of bipolar, migraine, hypokalemia, pyelonephritis who presents to this ED for evaluation of left anterior chest wall pain, left anterior abdominal pain.  Patient reports symptoms started overnight and into this morning.  Has not take any medication for pain.  Denies any vomiting.  Denies any changes in bowel or bladder.  Denies any pain with urination.  Notes that she does have increased pain with deep inspiration.  No fevers.  No coughing.  Denies alcohol use    PAST MEDICAL HISTORY:  Past Medical History:   Diagnosis Date    Bartholin cyst     I & D x 2    Bipolar disorder (H)     Cervical dysplasia 1998    Genital herpes     GERD (gastroesophageal reflux disease)     Migraine        PAST SURGICAL HISTORY:  Past Surgical History:   Procedure Laterality Date    C/SECTION, LOW TRANSVERSE      CRYOTHERAPY, CERVICAL  1998    TUBAL LIGATION             CURRENT MEDICATIONS:    cephALEXin (KEFLEX) 500 MG capsule  CLONAZEPAM PO  NORTRIPTYLINE HCL PO  QUEtiapine (SEROQUEL) 25 MG tablet  rizatriptan (MAXALT-MLT) 10 MG disintegrating tablet  topiramate (TOPAMAX) 25 MG capsule  TRAZODONE HCL PO         ALLERGIES:  Allergies   Allergen Reactions    Sulfa Antibiotics Difficulty breathing and Rash       FAMILY HISTORY:  Family History   Problem Relation Age of Onset    Alcohol/Drug Mother     Heart Disease Father         age 62    Alcohol/Drug Maternal Grandmother     Alcohol/Drug Maternal Grandfather     Cerebrovascular Disease Maternal Grandfather     Heart Disease Paternal Grandfather     Breast Cancer Maternal Aunt         age 56        SOCIAL HISTORY:   Social History     Socioeconomic History    Marital status: Single    Number of children: 3    Years of education: 11   Occupational History     Employer: [employer]     Employer: UNEMPLOYED   Tobacco Use    Smoking status: Every Day     Current packs/day: 0.50     Types: Cigarettes, Vaping Device    Tobacco comments:     hoping to get back on chantix   Vaping Use    Vaping status: Every Day   Substance and Sexual Activity    Alcohol use: No    Drug use: No    Sexual activity: Yes     Partners: Male     Social Drivers of Health     Financial Resource Strain: Low Risk  (2/3/2025)    [facility]    Financial Resource Strain     Difficulty of Paying Living Expenses: 3   Food Insecurity: No Food Insecurity (2/3/2025)    Received from [facility]    Food Insecurity     Do you worry your food will run out before you are able to buy more?: 1   Recent Concern: Food Insecurity - Food Insecurity Present (1/15/2025)    [facility]    Hunger Vital Sign     Worried About Running Out of Food in the Last Year: Sometimes true     Ran Out of Food in the Last Year: Sometimes true   Transportation Needs: No Transportation Needs (2/3/2025)    Received from [facility]    Transportation Needs     Does lack of transportation keep you from medical appointments?: 1     Does lack of transportation keep you from work, meetings or getting things that you need?: 1   Social Connections: Socially Integrated (2/3/2025)    Received from [facility]    Social Connections     Do you often feel lonely or isolated from those around you?: 0   Housing Stability: Low Risk  (2/3/2025)    Received from [facility]    Housing Stability     What is your housing situation today?: 1       VITALS:  /70   Pulse 72   Temp 98.1  F (36.7  " C) (Oral)   Resp 20   Ht 1.575 m (5' 2\")   Wt 53 kg (116 lb 12.8 oz)   LMP 03/23/2012   SpO2 97%   BMI 21.36 kg/m      PHYSICAL EXAM    Physical Exam  Vitals and nursing note reviewed.   Constitutional:       General: She is not in acute distress.     Appearance: Normal appearance. She is normal weight. She is not toxic-appearing or diaphoretic.   HENT:      Right Ear: External ear normal.      Left Ear: External ear normal.   Eyes:      Conjunctiva/sclera: Conjunctivae normal.   Cardiovascular:      Rate and Rhythm: Normal rate and regular rhythm.      Heart sounds: Normal heart sounds.   Pulmonary:      Effort: Pulmonary effort is normal.   Abdominal:      General: Abdomen is flat. There is no distension.      Palpations: Abdomen is soft.      Tenderness: There is abdominal tenderness in the epigastric area. There is no right CVA tenderness, left CVA tenderness, guarding or rebound.   Musculoskeletal:      Comments: Tenderness to palpation of the left anterior chest, at the border of the ribs.  No crepitus or deformity. No reported trauma to the area.    Skin:     Coloration: Skin is not pale.      Findings: No rash.   Neurological:      Mental Status: She is alert. Mental status is at baseline.        LAB:  All pertinent labs reviewed and interpreted.  Labs Ordered and Resulted from Time of ED Arrival to Time of ED Departure   COMPREHENSIVE METABOLIC PANEL - Abnormal       Result Value    Sodium 140      Potassium 4.2      Carbon Dioxide (CO2) 31 (*)     Anion Gap 7      Urea Nitrogen 8.0      Creatinine 0.63      GFR Estimate >90      Calcium 9.8      Chloride 102      Glucose 107 (*)     Alkaline Phosphatase 87      AST 22      ALT 18      Protein Total 7.2      Albumin 4.2      Bilirubin Total 0.2     LIPASE - Normal    Lipase 47     BILIRUBIN DIRECT - Normal    Bilirubin Direct 0.09     CRP INFLAMMATION - Normal    CRP Inflammation <3.00     CBC WITH PLATELETS AND DIFFERENTIAL    WBC Count 9.4      RBC " Count 4.22      Hemoglobin 12.8      Hematocrit 38.9      MCV 92      MCH 30.3      MCHC 32.9      RDW 12.2      Platelet Count 293      % Neutrophils 64      % Lymphocytes 27      % Monocytes 7      % Eosinophils 2      % Basophils 0      % Immature Granulocytes 0      NRBCs per 100 WBC 0      Absolute Neutrophils 5.9      Absolute Lymphocytes 2.5      Absolute Monocytes 0.7      Absolute Eosinophils 0.2      Absolute Basophils 0.0      Absolute Immature Granulocytes 0.0      Absolute NRBCs 0.0         RADIOLOGY:  Reviewed all pertinent imaging. Please see official radiology report.  CT Chest/Abdomen/Pelvis w Contrast   Final Result   IMPRESSION:      1.  No acute abnormality or specific cause of the patient's symptoms are identified.      2.  Mild upper lobe predominant emphysema with prominent bullae at the apices.        PROCEDURES:   None.    Rajeev Caldwell PA-C  Emergency Medicine  [facility] EMERGENCY DEPARTMENT  [facility] [facility] [address] [phone]  Dept: [phone]       Rajeev Caldwell PA-C  06/24/25 1504

## 2025-06-24 NOTE — DISCHARGE INSTRUCTIONS
You were seen here in the emergency department for evaluation of left-sided anterior chest wall pain.  Your CT chest abdomen pelvis does not show significant abnormalities.  As we discussed, there may be some inflammation within the lung wall which is causing some of the chest discomfort.  Otherwise your laboratory studies here are reassuring.  Continue with ibuprofen and Tylenol, return to emergency department significant worsening shortness of breath or chest pain.    For pain or fever you may use:  -Tylenol 650 mg every 6 hours.  Max 4000 mg in 24 hours  Do not use thismedication with alcohol as it can cause liver problems.  -Ibuprofen 600 mg every 6 hours.  Max 3500 mg in 24 hours  Do not take this medication if you have a history of a GI bleed or have kidney problems.  You may use both of these medications at the same time or you can alternate them every 3 hours.  For example, Tylenol at 6 AM, ibuprofen at 9 AM, Tylenol at noon, etc.

## 2025-06-24 NOTE — ED TRIAGE NOTES
"Pt states her left mid back, under her ribs, have been sore for the last week but worse the last few days. Pt states that her back pain is worse with deep breaths. Pt also endorses a \"lump\" and pain to left upper abdomen. She also has a \"bite\" to her right hand she wants looked at.      Triage Assessment (Adult)       Row Name 06/24/25 9260          Triage Assessment    Airway WDL WDL        Respiratory WDL    Respiratory WDL WDL        Skin Circulation/Temperature WDL    Skin Circulation/Temperature WDL WDL        Cardiac WDL    Cardiac WDL WDL        Peripheral/Neurovascular WDL    Peripheral Neurovascular WDL WDL        Cognitive/Neuro/Behavioral WDL    Cognitive/Neuro/Behavioral WDL WDL                     "